# Patient Record
Sex: FEMALE | Race: WHITE | NOT HISPANIC OR LATINO | Employment: FULL TIME | URBAN - METROPOLITAN AREA
[De-identification: names, ages, dates, MRNs, and addresses within clinical notes are randomized per-mention and may not be internally consistent; named-entity substitution may affect disease eponyms.]

---

## 2021-05-30 ENCOUNTER — RECORDS - HEALTHEAST (OUTPATIENT)
Dept: ADMINISTRATIVE | Facility: CLINIC | Age: 26
End: 2021-05-30

## 2023-09-10 ENCOUNTER — APPOINTMENT (OUTPATIENT)
Dept: CT IMAGING | Facility: CLINIC | Age: 28
End: 2023-09-10
Attending: EMERGENCY MEDICINE
Payer: COMMERCIAL

## 2023-09-10 ENCOUNTER — HOSPITAL ENCOUNTER (EMERGENCY)
Facility: CLINIC | Age: 28
Discharge: HOME | End: 2023-09-10
Attending: EMERGENCY MEDICINE | Admitting: EMERGENCY MEDICINE
Payer: COMMERCIAL

## 2023-09-10 ENCOUNTER — APPOINTMENT (OUTPATIENT)
Dept: RADIOLOGY | Facility: CLINIC | Age: 28
End: 2023-09-10
Attending: EMERGENCY MEDICINE
Payer: COMMERCIAL

## 2023-09-10 VITALS
OXYGEN SATURATION: 96 % | SYSTOLIC BLOOD PRESSURE: 134 MMHG | HEART RATE: 98 BPM | BODY MASS INDEX: 39.28 KG/M2 | RESPIRATION RATE: 20 BRPM | WEIGHT: 290 LBS | DIASTOLIC BLOOD PRESSURE: 63 MMHG | TEMPERATURE: 98.2 F | HEIGHT: 72 IN

## 2023-09-10 DIAGNOSIS — S82.141A CLOSED FRACTURE OF RIGHT TIBIAL PLATEAU, INITIAL ENCOUNTER: ICD-10-CM

## 2023-09-10 PROCEDURE — 99284 EMERGENCY DEPT VISIT MOD MDM: CPT | Mod: 25

## 2023-09-10 PROCEDURE — 250N000013 HC RX MED GY IP 250 OP 250 PS 637: Performed by: EMERGENCY MEDICINE

## 2023-09-10 PROCEDURE — 73560 X-RAY EXAM OF KNEE 1 OR 2: CPT | Mod: RT

## 2023-09-10 PROCEDURE — 73700 CT LOWER EXTREMITY W/O DYE: CPT | Mod: RT

## 2023-09-10 RX ORDER — OXYCODONE HYDROCHLORIDE 5 MG/1
5 TABLET ORAL EVERY 6 HOURS PRN
Qty: 15 TABLET | Refills: 0 | Status: SHIPPED | OUTPATIENT
Start: 2023-09-10

## 2023-09-10 RX ORDER — OXYCODONE AND ACETAMINOPHEN 5; 325 MG/1; MG/1
1 TABLET ORAL ONCE
Status: COMPLETED | OUTPATIENT
Start: 2023-09-10 | End: 2023-09-10

## 2023-09-10 RX ADMIN — OXYCODONE HYDROCHLORIDE AND ACETAMINOPHEN 1 TABLET: 5; 325 TABLET ORAL at 02:03

## 2023-09-10 ASSESSMENT — ACTIVITIES OF DAILY LIVING (ADL): ADLS_ACUITY_SCORE: 35

## 2023-09-10 NOTE — DISCHARGE INSTRUCTIONS
You were seen in the emergency department for right knee trauma.  Your evaluation revealed a right tibial plateau fracture.  Based on the appearance of this, it will require a surgical repair.  We discussed your case with the on-call provider for [facility].  They are going to send a message to their  to reach out to you early this week to get something set up with a knee specialist.  If you have not heard anything by midday on Monday, you can contact them yourself using the information above.  You cannot put any weight on the right leg until cleared by orthopedics.  We would recommend Tylenol 650 mg, ibuprofen 600 mg, and are going to prescribe you some oxycodone for breakthrough pain.  Keep the leg elevated and apply ice for the next couple of days to help with swelling.    Monitor for any symptoms of numbness or weakness in the right foot or lower leg and return to the ED right away if you develop any immediate concerns like this.

## 2023-09-10 NOTE — Clinical Note
Carmela Mirza was seen and treated in our emergency department on 9/10/2023.  She may return to work on 09/24/2023.  Final return to work to be directed by orthopedics     If you have any questions or concerns, please don't hesitate to call.      Adiel Smith MD

## 2023-09-10 NOTE — ED PROVIDER NOTES
EMERGENCY DEPARTMENT ENCOUNTER      NAME: Carmela Mirza  AGE: 28 year old female  YOB: 1995  MRN: [id number]  EVALUATION DATE & TIME: 9/10/2023  1:54 AM    PCP: Nadya Macdonald    ED PROVIDER: Adiel Smith M.D.      Chief Complaint   Patient presents with    Knee Injury         FINAL IMPRESSION:  1. Closed fracture of right tibial plateau, initial encounter          ED COURSE & MEDICAL DECISION MAKIN year old female presents to the Emergency Department for evaluation of right knee pain.  Patient sustained an injury while working outside, stepping down hard on her right leg.  X-rays reveal a lateral tibial plateau fracture with 0.5 cm displacement.  Case discussed with orthopedics.  No evidence of impending compartment syndrome or other complication.  Patient was placed in a knee immobilizer.  CT was obtained for further characterization of fracture and preoperative planning.  Patient able to get up and around with knee immobilizer crutches and nonweightbearing status.  We discussed a prompt referral to [facility] for follow-up and most likely surgical repair.  Updated patient and family on this plan and they were in agreement.  Discussed return precautions specifically related to any pain pallor numbness or weakness in the right lower extremity distal to the injury.  Patient was discharged in stable condition.    At the conclusion of the encounter I discussed the results of all of the tests and the disposition. The questions were answered. The patient or family acknowledged understanding and was agreeable with the care plan.     2:03 AM I met the patient and performed my initial interview and exam.   2:29 AM I spoke with Dr. Zhang, [facility], regarding patient plan of care.  2:36 AM Rechecked and updated patient on imaging results. Discussed plan of care for patient.       Medical Decision Making    History:  Supplemental history from: Documented in chart, if applicable and  Family Member/Significant Other  External Record(s) reviewed: Documented in chart, if applicable.    Work Up:  Chart documentation includes differential considered and any EKGs or imaging independently interpreted by provider, where specified.  In additional to work up documented, I considered the following work up: Documented in chart, if applicable.    External consultation:  Discussion of management with another provider: Documented in chart, if applicable and Orthopedics    Complicating factors:  Care impacted by chronic illness: Mental Health  Care affected by social determinants of health: N/A    Disposition considerations: Discharge. I prescribed additional prescription strength medication(s) as charted. See documentation for any additional details.            MEDICATIONS GIVEN IN THE EMERGENCY:  Medications   oxyCODONE-acetaminophen (PERCOCET) 5-325 MG per tablet 1 tablet (1 tablet Oral $Given 9/10/23 2520)       NEW PRESCRIPTIONS STARTED AT TODAY'S ER VISIT  New Prescriptions    OXYCODONE (ROXICODONE) 5 MG TABLET    Take 1 tablet (5 mg) by mouth every 6 hours as needed for pain          =================================================================    HPI    Patient information was obtained from: patient     Use of : N/A       Carmela Mirza is a 28 year old female with a pertinent history of anxiety who presents to this ED via walk-in for evaluation of knee injury.    Roughly 3 hours ago, patient was walking in a field and stepped up about one foot before accidentally dropping her foot down about 2 feet and jamming her right foot into the ground. At that point, she felt pain in her right leg and knee and couldn't bear weight on the leg. The leg does not hurt if she doesn't move it, but movement incites pain and nausea. She is unable to bend her knee. She has taken no pain medication for the pain.      REVIEW OF SYSTEMS   All systems reviewed and negative except as noted in HPI.    PAST  MEDICAL HISTORY:  No past medical history on file.    PAST SURGICAL HISTORY:  No past surgical history on file.        CURRENT MEDICATIONS:    No current facility-administered medications for this encounter.     Current Outpatient Medications   Medication    oxyCODONE (ROXICODONE) 5 MG tablet         ALLERGIES:  No Known Allergies    FAMILY HISTORY:  No family history on file.    SOCIAL HISTORY:   Social History     Socioeconomic History    Marital status: Single       VITALS:  /89   Pulse (!) 143   Temp 98.2  F (36.8  C) (Oral)   Resp 22   Ht 1.829 m (6')   Wt 131.5 kg (290 lb)   SpO2 95%   BMI 39.33 kg/m      PHYSICAL EXAM    Constitutional: Well developed, Well nourished, NAD.  HENT: Normocephalic, Atraumatic. Neck Supple.  Eyes: EOMI, Conjunctiva normal.  Respiratory: Breathing comfortably on room air. Speaks full sentences easily. Lungs clear to ascultation.  Cardiovascular: Normal heart rate, Regular rhythm. No peripheral edema.  Abdomen: Soft  Musculoskeletal: There is pain with any attempted range of motion of the right knee.  There is tenderness specifically over the entirety of the proximal tibia.  Mild asymmetric swelling.  Normal perfusion and DP and PT pulse of the right foot.  Sensation to light touch is intact throughout the right foot.  Integument: Warm, Dry.  Neurologic: Alert & awake, Normal motor function, Normal sensory function, No focal deficits noted.   Psychiatric: Cooperative. Affect appropriate.       RADIOLOGY:  Reviewed all pertinent imaging. Please see official radiology report.  CT Knee Right w/o Contrast   Preliminary Result   IMPRESSION:   1. Acute vertically-oriented comminuted fracture of the proximal aspect of the right tibia that extends from the tibial plateau through the lateral aspect of the proximal metaphysis. The major portion of the fracture is displaced laterally by 0.5 cm.    There is a 2.2 x 1.5 cm bone fragment of the mid aspect of the lateral tibial  plateau that is depressed by 0.5 cm.    2. Small knee joint lipohemarthrosis.      Preliminary Interpretation Dictated By: Jesse Franklin MD   Date: 9/10/2023       XR Knee Right 1/2 Views   Final Result   IMPRESSION:    1. Acute vertically-oriented fracture of the lateral aspect of the proximal right tibia extending from the tibial plateau distally through the lateral aspect of the proximal metaphysis. The fracture fragment is displaced laterally by approximately 0.5    cm.   2. Small right knee joint effusion.               I, Checo Jaime, am serving as a scribe to document services personally performed by Dr. Adiel Smith, based on my observation and the provider's statements to me. I, Adiel Smith MD attest that Checo Jaime is acting in a scribe capacity, has observed my performance of the services and has documented them in accordance with my direction.    Adiel Smith M.D.  Emergency Medicine  [facility] EMERGENCY ROOM  [facility] [facility] [id number] [phone]  Dept: [phone]       Adiel Smith MD  09/10/23 0305

## 2023-09-10 NOTE — ED TRIAGE NOTES
Patient arrives to the ER with c/o R knee pain. Patient was walking across a field when she stepped into a hole. Patient is unable to bear weight on it.      Triage Assessment       Row Name 09/10/23 0156       Triage Assessment (Adult)    Airway WDL WDL       Respiratory WDL    Respiratory WDL WDL       Skin Circulation/Temperature WDL    Skin Circulation/Temperature WDL WDL       Cardiac WDL    Cardiac WDL WDL       Peripheral/Neurovascular WDL    Peripheral Neurovascular WDL WDL       Cognitive/Neuro/Behavioral WDL    Cognitive/Neuro/Behavioral WDL WDL

## 2023-09-12 ENCOUNTER — TRANSFERRED RECORDS (OUTPATIENT)
Dept: HEALTH INFORMATION MANAGEMENT | Facility: CLINIC | Age: 28
End: 2023-09-12
Payer: COMMERCIAL

## 2023-12-24 ENCOUNTER — HEALTH MAINTENANCE LETTER (OUTPATIENT)
Age: 28
End: 2023-12-24

## 2025-01-18 ENCOUNTER — HEALTH MAINTENANCE LETTER (OUTPATIENT)
Age: 30
End: 2025-01-18